# Patient Record
Sex: MALE | Race: WHITE | URBAN - METROPOLITAN AREA
[De-identification: names, ages, dates, MRNs, and addresses within clinical notes are randomized per-mention and may not be internally consistent; named-entity substitution may affect disease eponyms.]

---

## 2022-06-24 ENCOUNTER — OFFICE VISIT (OUTPATIENT)
Dept: URBAN - METROPOLITAN AREA CLINIC 30 | Facility: CLINIC | Age: 37
End: 2022-06-24
Payer: COMMERCIAL

## 2022-06-24 DIAGNOSIS — H52.223 REGULAR ASTIGMATISM, BILATERAL: ICD-10-CM

## 2022-06-24 DIAGNOSIS — H53.19 OTHER SUBJECTIVE VISUAL DISTURBANCES: Primary | ICD-10-CM

## 2022-06-24 DIAGNOSIS — H10.45 OTHER CHRONIC ALLERGIC CONJUNCTIVITIS: ICD-10-CM

## 2022-06-24 PROCEDURE — 92133 CPTRZD OPH DX IMG PST SGM ON: CPT | Performed by: STUDENT IN AN ORGANIZED HEALTH CARE EDUCATION/TRAINING PROGRAM

## 2022-06-24 PROCEDURE — 92004 COMPRE OPH EXAM NEW PT 1/>: CPT | Performed by: STUDENT IN AN ORGANIZED HEALTH CARE EDUCATION/TRAINING PROGRAM

## 2022-06-24 ASSESSMENT — VISUAL ACUITY
OD: 20/20
OS: 20/20

## 2022-06-24 ASSESSMENT — INTRAOCULAR PRESSURE
OS: 14
OD: 16

## 2022-06-24 ASSESSMENT — KERATOMETRY
OD: 43.72
OS: 43.55

## 2022-06-24 NOTE — IMPRESSION/PLAN
Impression: Other subjective visual disturbances: H53.19.
-- re: referral concern for optic nerve change: pink & healthy with distinct borders, no abnormalities on OCT RNFL Plan: Per pt description, pt sees inkblots or other patterns with eyes closed. No disturbances when eyes are open. Pt ed likely normal visual phenomenon or phosphene effects. Denies flashes, floaters or changes in vision with eyes open. Pt ed unlikely to be related to ocular pathology, recommend cont care with PCP and scheduled appt with other specialty providers.

## 2022-06-24 NOTE — IMPRESSION/PLAN
Impression: Regular astigmatism, bilateral: H52.223. Plan: Reassured pt no need for prescription glasses as pt not having difficulty with distance/near vision with both eyes open.

## 2022-06-24 NOTE — IMPRESSION/PLAN
Impression: Other chronic allergic conjunctivitis: H10.45. Plan: Recommend switching/adding OTC olopatadine/ketotifen to current use of rhoto-v for irritation.  RTC insufficient relief